# Patient Record
Sex: FEMALE | Race: WHITE
[De-identification: names, ages, dates, MRNs, and addresses within clinical notes are randomized per-mention and may not be internally consistent; named-entity substitution may affect disease eponyms.]

---

## 2020-02-02 ENCOUNTER — HOSPITAL ENCOUNTER (EMERGENCY)
Dept: HOSPITAL 25 - UCEAST | Age: 70
Discharge: HOME | End: 2020-02-02
Payer: MEDICARE

## 2020-02-02 VITALS — DIASTOLIC BLOOD PRESSURE: 91 MMHG | SYSTOLIC BLOOD PRESSURE: 178 MMHG

## 2020-02-02 DIAGNOSIS — J02.9: Primary | ICD-10-CM

## 2020-02-02 DIAGNOSIS — Z88.2: ICD-10-CM

## 2020-02-02 DIAGNOSIS — Z88.1: ICD-10-CM

## 2020-02-02 DIAGNOSIS — I10: ICD-10-CM

## 2020-02-02 PROCEDURE — 87651 STREP A DNA AMP PROBE: CPT

## 2020-02-02 PROCEDURE — 99211 OFF/OP EST MAY X REQ PHY/QHP: CPT

## 2020-02-02 PROCEDURE — G0463 HOSPITAL OUTPT CLINIC VISIT: HCPCS

## 2020-02-02 NOTE — UC
Throat Pain/Nasal Ulises HPI





- HPI Summary


HPI Summary: 





68 y/o female presents to the urgent care c/o sore throat, nasal congestion w/ 

clear nasal discharge and itchy nose and eyes for the past week. sore throat is 

2/10 w/ clear PND.  Pt saw her PCP at the beginning of symptoms and and was Rx 

Allegra PO and Flonase Nasal spray to alleviate symptoms.  Pt is concerned w/ 

strep since she has a new grandchild at home.  Pt denies fever, dizziness, ear 

pain, cough, wheezing, SOB, chest pain, abdominal pain, N/V/D. 








- History of Current Complaint


Chief Complaint: UCGeneralIllness


Stated Complaint: SORE THROAT


Time Seen by Provider: 02/02/20 08:56


Hx Obtained From: Patient


Pregnant?: No - menopausal


Onset/Duration: Gradual Onset, Lasting Weeks - 1 week, Still Present


Severity: Mild


Pain Intensity: 2


Pain Scale Used: 0-10 Numeric


Cough: None


Associated Signs & Symptoms: Positive: Sinus Discomfort, Nasal Discharge - 

clear.  Negative: Wheezing, Fever, Rash


Related History: Seasonal Allergies





- Epiglottits Risk Factors


Epiglottis Risk Factors: Negative





- Allergies/Home Medications


Allergies/Adverse Reactions: 


 Allergies











Allergy/AdvReac Type Severity Reaction Status Date / Time


 


nitrofurantoin Allergy  Unknown Verified 02/02/20 08:58





   Reaction  





   Details  


 


Sulfa (Sulfonamide Allergy  Unknown Verified 02/02/20 08:58





Antibiotics)   Reaction  





   Details  














PMH/Surg Hx/FS Hx/Imm Hx


Previously Healthy: Yes


Endocrine History: Dyslipidemia


Cardiovascular History: Hypertension





- Surgical History


Surgical History: Yes


Surgery Procedure, Year, and Place: THREE C-SECTIONS.  TWO D & C'S





- Family History


Known Family History: Positive: Hypertension





- Social History


Occupation: Retired


Lives: With Family


Alcohol Use: None


Substance Use Type: None


Smoking Status (MU): Never Smoked Tobacco





Review of Systems


All Other Systems Reviewed And Are Negative: Yes


Constitutional: Positive: Negative


Skin: Positive: Negative


ENT: Positive: Sore Throat, Nasal Discharge - clear, Sinus Congestion, Other - 

PND clear


Respiratory: Positive: Negative


Cardiovascular: Positive: Negative


Gastrointestinal: Positive: Negative


Genitourinary: Positive: Negative


Motor: Positive: Negative


Neurovascular: Positive: Negative


Musculoskeletal: Positive: Negative


Neurological: Positive: Negative


Psychological: Positive: Negative


Is Patient Immunocompromised?: No





Physical Exam





- Summary


Physical Exam Summary: 





VITAL SIGNS: Reviewed. 


GENERAL:  Patient is a well developed and nourished  female  who is sitting 

comfortably in the examining table.  Patient is not in any acute respiratory 

distress. 


HEAD AND FACE: No signs of trauma.  No ecchymosis, hematomas or skull 

depressions. No sinus tenderness. 


EYES: PERRLA, EOMI x 2, No injected conjunctiva, no nystagmus. No photophobia.


EARS: Hearing grossly intact. Ear canals and tympanic membranes are within 

normal limits. 


MOUTH: Positive pharynx with mild erythema, no exudates, No  B/L tonsillar 

enlargement , no  exudate. Uvula in midline. edematous nasal mucosa w/ clear 

nasal discharge, clear PND


NECK: Supple, trachea is midline, Positive anterior cervical lymphadenopathy, 

no JVD, no carotid bruit, no c-spine tenderness, neck with full ROM. No 

meningeal signs, no Kernig's or brudzinskis signs. 


CHEST: Symmetric, no tenderness at palpation 


LUNGS: Clear to auscultation bilaterally. No wheezing or crackles.


CVS: Regular rate and rhythm, S1 and S2 present, no murmurs or gallops 

appreciated. 


ABDOMEN: Soft, non-tender. No signs of distention. No rebound no guarding, and 

no masses palpated. Bowel sounds are normal. 


EXTREMITIES: FROM in all major joints, no edema, no cyanosis or clubbing.


NEURO: Alert and oriented x 3. No acute neurological deficits. Pt  follows 

commands. 


SKIN: Dry and warm 








Triage Information Reviewed: Yes


Vital Signs: 


 Initial Vital Signs











Temp  98 F   02/02/20 08:51


 


Pulse  65   02/02/20 08:51


 


Resp  16   02/02/20 08:51


 


BP  178/91   02/02/20 08:51


 


Pulse Ox  100   02/02/20 08:51














Throat Pain/Nasal Course/Dx





- Course


Course Of Treatment: 


68 y/o female presents to the urgent care c/o sore throat, nasal congestion w/ 

clear nasal discharge and itchy nose and eyes for the past week. sore throat is 

2/10 w/ clear PND.  Pt saw her PCP at the beginning of symptoms and and was Rx 

Allegra PO and Flonase Nasal spray to alleviate symptoms.  Pt is concerned w/ 

strep since she has a new grandchild at home.  Pt denies fever, dizziness, ear 

pain, cough, wheezing, SOB, chest pain, abdominal pain, N/V/D. Hx obtained. 


Pt w/ a pharyngitis on examination, Rapid strep ordered, result: negative. 

Viral pharyngitis. Pt Rx ibuprofen PO to alleviates symptoms of pain and 

swelling. Pt also w/ possible allergic rhinitis and advised to continue taking 

Allegra and flonase nasal spray. Advised on hand washing to avoid spreading. Pt 

advised to rest, eat well and avoid strenuous exercise. If symptoms do not 

improve or worsen advised to return to the urgent care or f/u with her PCP for 

further evaluation and treatment.Pt's BP is elevated today, Pt has not taken 

her BP mediation yet today and is asymptomatic.  advised to be compliant w/ her 

medications and to decrease salt in diet, monitor BP and f/u with PCP for 

further management. D/c instructions explained.  Pt understood and agreed w/ 

plan of care.








- Differential Dx/Diagnosis


Differential Diagnosis/HQI/PQRI: Laryngitis, Mononucleosis, Pharyngitis, 

Sinusitis, Tonsillitis


Provider Diagnosis: 


 Acute pharyngitis, Uncontrolled hypertension








Discharge ED





- Sign-Out/Discharge


Documenting (check all that apply): Patient Departure


All imaging exams completed and their final reports reviewed: No Studies





- Discharge Plan


Condition: Stable


Disposition: HOME


Patient Education Materials:  Pharyngitis (ED)


Referrals: 


Marta Diallo MD [Primary Care Provider] - 


Additional Instructions: 


1-Please take ibuprofen PO q6-8hrs prn as instructed after meals to alleviate 

pain and swelling. Increase fluid intake, eat well, rest and avoid strenuous 

exercise


2-Please continue using saline drops , Flonase Nasal spray and Allegra tp 

alleviate your allergic rhinitis


3-If symptoms do not improve or worsen please return to the urgent care or f/u 

with your PCP for further evaluation and treatment.


4-Your BP is elevated today. Please take your BP medications and decrease salt 

in your diet, monitor BP and if it continues to be elevated please f/u with 

your PCP for further management. If you develop chest pain, dizziness, visual 

disturbances, SOB, or severe HA please go immediately to the ER for further 

management








- Billing Disposition and Condition


Condition: STABLE


Disposition: Home